# Patient Record
Sex: MALE | Race: WHITE | NOT HISPANIC OR LATINO | Employment: FULL TIME | URBAN - METROPOLITAN AREA
[De-identification: names, ages, dates, MRNs, and addresses within clinical notes are randomized per-mention and may not be internally consistent; named-entity substitution may affect disease eponyms.]

---

## 2022-04-12 ENCOUNTER — EVALUATION (OUTPATIENT)
Dept: PHYSICAL THERAPY | Facility: CLINIC | Age: 27
End: 2022-04-12
Payer: COMMERCIAL

## 2022-04-12 DIAGNOSIS — M62.81 MUSCLE WEAKNESS (GENERALIZED): ICD-10-CM

## 2022-04-12 DIAGNOSIS — M54.50 CHRONIC BILATERAL LOW BACK PAIN WITHOUT SCIATICA: Primary | ICD-10-CM

## 2022-04-12 DIAGNOSIS — G89.29 CHRONIC BILATERAL LOW BACK PAIN WITHOUT SCIATICA: Primary | ICD-10-CM

## 2022-04-12 PROCEDURE — 97110 THERAPEUTIC EXERCISES: CPT | Performed by: PHYSICAL THERAPIST

## 2022-04-12 PROCEDURE — 97162 PT EVAL MOD COMPLEX 30 MIN: CPT | Performed by: PHYSICAL THERAPIST

## 2022-04-12 NOTE — LETTER
2022    Adrian Hermosillo MD  [address] [address]    Patient: Ebenezer Nobles   YOB: 1995   Date of Visit: 2022     Encounter Diagnosis     ICD-10-CM    1  Chronic bilateral low back pain without sciatica  M54 50     G89 29    2  Muscle weakness (generalized)  M62 81        Dear Dr Amie Lares: Thank you for your recent referral of Mason Corcoran  Please review the attached evaluation summary from Mason's recent visit  Please verify that you agree with the plan of care by signing the attached order  If you have any questions or concerns, please do not hesitate to call  I sincerely appreciate the opportunity to share in the care of one of your patients and hope to have another opportunity to work with you in the near future  Sincerely,    Faith Sheppard, PT      Referring Provider:      I certify that I have read the below Plan of Care and certify the need for these services furnished under this plan of treatment while under my care  Adrian Hermosillo MD  [address] [address]  Via Fax: [phone]          PT Evaluation     Today's date: 2022  Patient name: Ebenezer Nobles  : 1995  MRN: [id number]  Referring provider: Harmony Feldman MD  Dx:   Encounter Diagnosis     ICD-10-CM    1  Chronic bilateral low back pain without sciatica  M54 50     G89 29    2   Muscle weakness (generalized)  M62 81          Assessment  Assessment details: Ebenezer Nobles is a 31 y o  male presenting to outpatient physical therapy at [place] [address] with complaints of chronic low back pain with recent exacerbation in March after lifting   He presents with decreased range of motion, decreased strength, limited flexibility, poor postural awareness, poor body mechanics, poor balance, decreased tolerance to activity and decreased functional mobility due to Chronic bilateral low back pain without sciatica  (primary encounter diagnosis), and Muscle weakness (generalized)  Therapist discussed diagnosis, prognosis, plan of care, proper responses to exercises, HEP, and modalities to use at home   [facility] would benefit from skilled PT services in order to address these deficits and reach maximum level of function   Thank you for the referral!  Impairments: abnormal coordination, abnormal muscle firing, abnormal muscle tone, abnormal or restricted ROM, abnormal movement, activity intolerance, impaired physical strength, lacks appropriate home exercise program, pain with function, scapular dyskinesis, poor posture  and poor body mechanics    Symptom irritability: moderateUnderstanding of Dx/Px/POC: good   Prognosis: good    Goals  STGs (in 4 weeks):  1  Pt will report having at least a 50% improvement since I E    2  Pt will report having at most a 2/10 pain level with functional mobility  3  Pt will have 4+/5 MMT strength throughout core and BLEs  LTGs (in 12 weeks):  1  Pt will report having at most a 75% improvement since I E    2  Pt will demonstrate good lifting techniques without onset of pain  3  Pt will be independent with HEP  4  Pt will deny having pain with getting OOB in AM      Plan  Patient would benefit from: skilled physical therapy  Planned modality interventions: TENS and unattended electrical stimulation  Planned therapy interventions: joint mobilization, manual therapy, neuromuscular re-education, patient education, self care, strengthening, stretching, therapeutic activities, therapeutic exercise, home exercise program, gait training, flexibility and balance  Frequency: 2x week  Plan of Care beginning date: 4/12/2022  Plan of Care expiration date: 7/5/2022  Treatment plan discussed with: patient        Subjective Evaluation    History of Present Illness  Mechanism of injury: Pt is a 31year old male who presents with chief complaint of chronic low back pain with exacerbation in March after his back gave out when he was lifting  He reports imaging revealed slight bulging disc  He was having radiculopathy however denies having it currently  Now referred to skilled OP PT  Quality of life: good    Pain  Current pain ratin  At best pain ratin  At worst pain ratin  Quality: dull ache, discomfort, pressure, sharp, tight, throbbing and pulling  Relieving factors: heat, medications, relaxation and rest  Aggravating factors: lifting and running (AM pain, difficulty OOB transfers)  Progression: worsening    Treatments  Previous treatment: chiropractic  Patient Goals  Patient goals for therapy: decreased pain, improved balance, increased motion, increased strength, independence with ADLs/IADLs, return to sport/leisure activities and return to work          Objective     Posture: Lumbar lordosis is decreased in standing  There is no lateral shift  In siting, lumbar roll increased comfort      Lumbar AROM limitations:  (*=  Pain)  Lumbar flexion: 100%  Lumbar extension: 75%  R side glide:  75%  L side glide:  75%    Mechanical Asessment: pre-test symtpoms include low back discomfort (L worse than R side)  Repeated Extension in Standing (OMA): improved  Repeated Extension in Lying (REIL):  improved    Strength:  Core strength: Upper abs: 3-/5; lower abs: 3-/5     Right  Left  Hip flexion:  4/5  4/5  Knee ext  4/5  4/5  Ankle DF  4/5  4/5  Ankle PF  4/5  4/5  Knee flex  4-/5  4-/5      Hip abduction  4/5  4-/5  Hip adduction  4-/5  3+/5  Hip extension  4-/5  3+/5    Joint mobility: decreased PA glides lumbar spine    Tenderness/Palpation: TTPR B lumbar paraspinals (L worse than R)    Sensation: intact throughout BLE to light touch     Flexibility: decreased HS, hip flexor, glutes, and quadriceps flexibility    Function: decreased glute activation with squats with increased trunk flexion        Precautions: hx of LBP c radiculopathy    HEP: PPU, prone quad stretch c SOS, TAC, Hooklying Ball Squeezes, TB Bridges; TB Sidelying Clamshells, TAC, Foam roller, piriformis stretch    Specialty Daily Treatment Diary       Manual                                                Exercise Diary                 PPU 5 sec x 10       PPU c OP        Prone quad stretch c SOS c glute set 20 sec x 3               Prone H' Ext        Swimmer's (alt UE and alt LE)        Superman                TAC 10 sec x 5       TAC c Marching        TAC c OH Reach c BLE lifted        TAC c SLR (flex)        TAC Dying Bug                Quadruped TAC c Alt UE and LE                TAC c Plank        Plank c BLE on Red PB                        Hooklying Ball Squeeze 5 sec x 5       Ball c Bridge                Sidelying Clamshells BTB 5 secx 10 ea       TB Bridges BTB 5 sec x 10                Sidelying H' ABD        Sidelying Combo TB H' ABD to Extension                        TB Squats                                                Foam Roller 5 mins piriformis and thoracic spine               Static stretchin way piriformis; hamstring stretch 20 sec x 3 (2 way piriformis stretch)                       HEP/EDU Diagnosis, prognosis, plan of care, proper responses to exercises, foam roller       Modalities                CP                   Skin checks performed pre and post application: intact

## 2022-04-12 NOTE — PROGRESS NOTES
PT Evaluation     Today's date: 2022  Patient name: Radha Peace  : 1995  MRN: [id number]  Referring provider: Sofie Wilder MD  Dx:   Encounter Diagnosis     ICD-10-CM    1  Chronic bilateral low back pain without sciatica  M54 50     G89 29    2  Muscle weakness (generalized)  M62 81          Assessment  Assessment details: Radha Peace is a 31 y o  male presenting to outpatient physical therapy at [place] [address] with complaints of chronic low back pain with recent exacerbation in March after lifting   He presents with decreased range of motion, decreased strength, limited flexibility, poor postural awareness, poor body mechanics, poor balance, decreased tolerance to activity and decreased functional mobility due to Chronic bilateral low back pain without sciatica  (primary encounter diagnosis), and Muscle weakness (generalized)  Therapist discussed diagnosis, prognosis, plan of care, proper responses to exercises, HEP, and modalities to use at home   [facility] would benefit from skilled PT services in order to address these deficits and reach maximum level of function   Thank you for the referral!  Impairments: abnormal coordination, abnormal muscle firing, abnormal muscle tone, abnormal or restricted ROM, abnormal movement, activity intolerance, impaired physical strength, lacks appropriate home exercise program, pain with function, scapular dyskinesis, poor posture  and poor body mechanics    Symptom irritability: moderateUnderstanding of Dx/Px/POC: good   Prognosis: good    Goals  STGs (in 4 weeks):  1  Pt will report having at least a 50% improvement since I E    2  Pt will report having at most a 2/10 pain level with functional mobility  3  Pt will have 4+/5 MMT strength throughout core and BLEs  LTGs (in 12 weeks):  1  Pt will report having at most a 75% improvement since I E    2  Pt will demonstrate good lifting techniques without onset of pain  3  Pt will be independent with HEP     4  Pt will deny having pain with getting OOB in AM      Plan  Patient would benefit from: skilled physical therapy  Planned modality interventions: TENS and unattended electrical stimulation  Planned therapy interventions: joint mobilization, manual therapy, neuromuscular re-education, patient education, self care, strengthening, stretching, therapeutic activities, therapeutic exercise, home exercise program, gait training, flexibility and balance  Frequency: 2x week  Plan of Care beginning date: 2022  Plan of Care expiration date: 2022  Treatment plan discussed with: patient        Subjective Evaluation    History of Present Illness  Mechanism of injury: Pt is a 31year old male who presents with chief complaint of chronic low back pain with exacerbation in March after his back gave out when he was lifting  He reports imaging revealed slight bulging disc  He was having radiculopathy however denies having it currently  Now referred to skilled OP PT  Quality of life: good    Pain  Current pain ratin  At best pain ratin  At worst pain ratin  Quality: dull ache, discomfort, pressure, sharp, tight, throbbing and pulling  Relieving factors: heat, medications, relaxation and rest  Aggravating factors: lifting and running (AM pain, difficulty OOB transfers)  Progression: worsening    Treatments  Previous treatment: chiropractic  Patient Goals  Patient goals for therapy: decreased pain, improved balance, increased motion, increased strength, independence with ADLs/IADLs, return to sport/leisure activities and return to work          Objective     Posture: Lumbar lordosis is decreased in standing  There is no lateral shift  In siting, lumbar roll increased comfort      Lumbar AROM limitations:  (*=  Pain)  Lumbar flexion: 100%  Lumbar extension: 75%  R side glide:  75%  L side glide:  75%    Mechanical Asessment: pre-test symtpoms include low back discomfort (L worse than R side)  Repeated Extension in Standing (OMA): improved  Repeated Extension in Lying (REIL):  improved    Strength:  Core strength: Upper abs: 3-/5; lower abs: 3-/5     Right  Left  Hip flexion:  4/5  4/5  Knee ext  4/5  4/5  Ankle DF  4/5  4/5  Ankle PF  4/5  4/5  Knee flex  4-/5  4-/5      Hip abduction  4/5  4-/5  Hip adduction  4-/5  3+/5  Hip extension  4-/5  3+/5    Joint mobility: decreased PA glides lumbar spine    Tenderness/Palpation: TTPR B lumbar paraspinals (L worse than R)    Sensation: intact throughout BLE to light touch     Flexibility: decreased HS, hip flexor, glutes, and quadriceps flexibility    Function: decreased glute activation with squats with increased trunk flexion        Precautions: hx of LBP c radiculopathy    HEP: PPU, prone quad stretch c SOS, TAC, Hooklying Ball Squeezes, TB Bridges; TB Sidelying Clamshells, TAC, Foam roller, piriformis stretch    Specialty Daily Treatment Diary       Manual                                                Exercise Diary                 PPU 5 sec x 10       PPU c OP        Prone quad stretch c SOS c glute set 20 sec x 3               Prone H' Ext        Swimmer's (alt UE and alt LE)        Superman                TAC 10 sec x 5       TAC c Marching        TAC c OH Reach c BLE lifted        TAC c SLR (flex)        TAC Dying Bug                Quadruped TAC c Alt UE and LE                TAC c Plank        Plank c BLE on Red PB                        Hooklying Ball Squeeze 5 sec x 5       Ball c Bridge                Sidelying Clamshells BTB 5 secx 10 ea       TB Bridges BTB 5 sec x 10                Sidelying H' ABD        Sidelying Combo TB H' ABD to Extension                        TB Squats                                                Foam Roller 5 mins piriformis and thoracic spine               Static stretchin way piriformis; hamstring stretch 20 sec x 3 (2 way piriformis stretch)                       HEP/EDU Diagnosis, prognosis, plan of care, proper responses to exercises, foam roller       Modalities                CP                   Skin checks performed pre and post application: intact

## 2022-04-19 ENCOUNTER — OFFICE VISIT (OUTPATIENT)
Dept: PHYSICAL THERAPY | Facility: CLINIC | Age: 27
End: 2022-04-19
Payer: COMMERCIAL

## 2022-04-19 DIAGNOSIS — M79.672 LEFT FOOT PAIN: Primary | ICD-10-CM

## 2022-04-19 PROCEDURE — 97162 PT EVAL MOD COMPLEX 30 MIN: CPT | Performed by: PHYSICAL THERAPIST

## 2022-04-19 PROCEDURE — 97110 THERAPEUTIC EXERCISES: CPT | Performed by: PHYSICAL THERAPIST

## 2022-04-19 PROCEDURE — 97140 MANUAL THERAPY 1/> REGIONS: CPT | Performed by: PHYSICAL THERAPIST

## 2022-04-19 NOTE — PROGRESS NOTES
PT Evaluation     Today's date: 2022  Patient name: Juan Aranda  : 1995  MRN: [id number]  Referring provider: Hanna Fuentes, PT  Dx:   Encounter Diagnosis     ICD-10-CM    1  Left foot pain  M79 672                   Assessment  Assessment details: Juan Aranda is a 31 y o  male presenting to outpatient physical therapy at [place] [address] with complaints of chronic progressive L foot pain with insidious onset    He presents with decreased range of motion, decreased strength, limited flexibility, poor postural awareness, poor body mechanics, poor balance, decreased tolerance to activity and decreased functional mobility due to Left foot pain  (primary encounter diagnosis)  Therapist discussed diagnosis, prognosis, plan of care, proper responses to exercises, HEP, modalities to use at home, and discussed night splint    He would benefit from skilled PT services in order to address these deficits and reach maximum level of function   Thank you for the referral!  Impairments: abnormal coordination, abnormal gait, abnormal muscle firing, abnormal muscle tone, abnormal or restricted ROM, abnormal movement, activity intolerance, impaired physical strength, lacks appropriate home exercise program, pain with function and poor body mechanics    Symptom irritability: moderateUnderstanding of Dx/Px/POC: good   Prognosis: good    Goals  STGs (in 4 weeks):  1  Pt will report having at least a 50% improvement since I E    2  Pt will report having at most a 2/10 pain level with functional mobility  3  Pt will demonstrate improved intrinsic strength of his left foot to perform toe crunches without onset of pain  LTGs (in 12 weeks):  1  Pt will report having at least a 75% improvement since I E    2  Pt will be able to stand at work with good posture without onset of pain  3  Pt will report having minimal AM pain when he gets OOB  4  Pt will be independent with HEP       Plan  Patient would benefit from: skilled physical therapy  Planned modality interventions: unattended electrical stimulation, ultrasound and TENS  Planned therapy interventions: joint mobilization, manual therapy, neuromuscular re-education, patient education, self care, strengthening, stretching, therapeutic activities, therapeutic exercise, home exercise program, gait training, flexibility and balance  Frequency: 2x week  Plan of Care beginning date: 2022  Plan of Care expiration date: 2022  Treatment plan discussed with: patient        Subjective Evaluation    History of Present Illness  Mechanism of injury: Pt is a 31year old male with insidious onset of left foot pain that was increasing over the past 6 months  Quality of life: good    Pain  Current pain ratin  At best pain ratin  At worst pain ratin  Quality: discomfort, dull ache, sharp, tight and pressure  Relieving factors: relaxation and rest  Exacerbated by: prolonged standing, AM pain/stiffness  Progression: worsening    Patient Goals  Patient goals for therapy: improved balance, decreased pain, increased motion, increased strength, independence with ADLs/IADLs, return to sport/leisure activities and return to work          Objective    Palpation: TTPR origin plantar fascia    AROM:    R  L  Ankle DF     20  15  Ankle PF    55  50  Ankle inversion  35  30  Ankle eversion   20  15    STRENGTH:    R  L    Ankle DF   4/5  4/5  Ankle PF   4/5  3+/5  Ankle inversion  4/5  3+/5  Ankle eversoin   4/5  3+/5    Static Knee Ext  4+/5  4+/5  Static Knee flex  4+/5  4+/5  Hip abduction   4/5  4/5  Hip extension   4/5  4/5      Pt's ambulation demonstrates symmetrical gait pattern       Function:  Squat: slight weight shift on the RLE with valgus collapse of medial longitudinal arch  Step up/down: reciprocal       Precautions: standard    HEP: toe scrunches, dowel roll, plantar stretch, gastroc-soleus stretch    Specialty Daily Treatment Diary       Manual        STM/TPR L plantar aspect SMF       IASTM L calf and plantar aspect  SMF                               Exercise Diary                 TB Inversion/ Eversion                Seated Toe Crunches c transfer 1 min        Dowel Roll 1  min       Toe Yoga (GT down, little toes up; GT up and little toes down)        Kevil                 Standing HR eccentric lowering        Toe raises with back against wall                Slant board gastroc-soleus stretch 20 sec x 3 ea               Standing Toe extension stretch 10 sec x 3 ea                               Supine HS stretch 20 sec x 3 ea                       Foam Roller calves                HEP/EDU        Modalities                CP                   Skin checks performed pre and post application: intact

## 2022-04-26 ENCOUNTER — OFFICE VISIT (OUTPATIENT)
Dept: PHYSICAL THERAPY | Facility: CLINIC | Age: 27
End: 2022-04-26
Payer: COMMERCIAL

## 2022-04-26 DIAGNOSIS — M54.50 CHRONIC BILATERAL LOW BACK PAIN WITHOUT SCIATICA: ICD-10-CM

## 2022-04-26 DIAGNOSIS — M62.81 MUSCLE WEAKNESS (GENERALIZED): ICD-10-CM

## 2022-04-26 DIAGNOSIS — M79.672 LEFT FOOT PAIN: Primary | ICD-10-CM

## 2022-04-26 DIAGNOSIS — G89.29 CHRONIC BILATERAL LOW BACK PAIN WITHOUT SCIATICA: ICD-10-CM

## 2022-04-26 PROCEDURE — 97112 NEUROMUSCULAR REEDUCATION: CPT

## 2022-04-26 PROCEDURE — 97110 THERAPEUTIC EXERCISES: CPT

## 2022-04-26 NOTE — PROGRESS NOTES
Daily Note     Today's date: 2022  Patient name: Anita Blankenship  : 1995  MRN: [id number]  Referring provider: Kishan Eason, PT  Dx:   Encounter Diagnosis     ICD-10-CM    1  Left foot pain  M79 672    2  Chronic bilateral low back pain without sciatica  M54 50     G89 29    3  Muscle weakness (generalized)  M62 81                   Subjective: Patient states he is feeling some soreness today  Objective: See treatment diary below      Assessment: Tolerated treatment well  Initiated POC as able today  Continued to progress through exercises  No increase in pain was noted throughout session  Encouraged patient to continue stretching at home  Patient demonstrated fatigue post treatment, exhibited good technique with therapeutic exercises and would benefit from continued PT      Plan: Continue per plan of care        Precautions: hx of LBP c radiculopathy    HEP: PPU, prone quad stretch c SOS, TAC, Hooklying Ball Squeezes, TB Bridges; TB Sidelying Clamshells, TAC, Foam roller, piriformis stretch    Specialty Daily Treatment Diary       Manual                                               Exercise Diary                 PPU 5 sec x 10 5 sec x 15      PPU c OP        Prone quad stretch c SOS c glute set 20 sec x 3 20 sec x 3               Prone H' Ext        Swimmer's (alt UE and alt LE)        Superman                TAC 10 sec x 5 5 sec x 20      TAC c Marching        TAC c OH Reach c BLE lifted  3# 2x10      TAC c SLR (flex)        TAC Dying Bug                Quadruped TAC c Alt UE and LE                TAC c Plank        Plank c BLE on Red PB                        Hooklying Ball Squeeze 5 sec x 5 5 sec x 20      Ball c Bridge                Sidelying Clamshells BTB 5 secx 10 ea BTB 5 sec x 20      TB Bridges BTB 5 sec x 10  BTB 5 sec x 20              Sidelying H' ABD        Sidelying Combo TB H' ABD to Extension                        TB Squats Foam Roller 5 mins piriformis and thoracic spine 5 min piriformis and thoracic spine              Static stretchin way piriformis; hamstring stretch 20 sec x 3 (2 way piriformis stretch) 20 sec x 3                       HEP/EDU Diagnosis, prognosis, plan of care, proper responses to exercises, foam roller       Modalities                CP

## 2022-05-03 ENCOUNTER — OFFICE VISIT (OUTPATIENT)
Dept: PHYSICAL THERAPY | Facility: CLINIC | Age: 27
End: 2022-05-03
Payer: COMMERCIAL

## 2022-05-03 DIAGNOSIS — G89.29 CHRONIC BILATERAL LOW BACK PAIN WITHOUT SCIATICA: Primary | ICD-10-CM

## 2022-05-03 DIAGNOSIS — M54.50 CHRONIC BILATERAL LOW BACK PAIN WITHOUT SCIATICA: Primary | ICD-10-CM

## 2022-05-03 DIAGNOSIS — M62.81 MUSCLE WEAKNESS (GENERALIZED): ICD-10-CM

## 2022-05-03 PROCEDURE — 97110 THERAPEUTIC EXERCISES: CPT

## 2022-05-03 PROCEDURE — 97112 NEUROMUSCULAR REEDUCATION: CPT

## 2022-05-03 NOTE — PROGRESS NOTES
Daily Note     Today's date: 5/3/2022  Patient name: Dru Dash  : 1995  MRN: [id number]  Referring provider: Sadie Guadarrama, PT  Dx:   Encounter Diagnosis     ICD-10-CM    1  Chronic bilateral low back pain without sciatica  M54 50     G89 29    2  Muscle weakness (generalized)  M62 81                   Subjective: Patient offers no new complaints arriving to therapy  Objective: See treatment diary below      Assessment: Tolerated treatment well  Continued to progress patient through all TE today  Added in additional exercises with good response, denies increase in sxs throughout session  Patient demonstrated fatigue post treatment, exhibited good technique with therapeutic exercises and would benefit from continued PT      Plan: Continue per plan of care        Precautions: hx of LBP c radiculopathy    HEP: PPU, prone quad stretch c SOS, TAC, Hooklying Ball Squeezes, TB Bridges; TB Sidelying Clamshells, TAC, Foam roller, piriformis stretch    Specialty Daily Treatment Diary       Manual 4/12 4/26 5/3                                             Exercise Diary                 PPU 5 sec x 10 5 sec x 15 5 sec x 20     PPU c OP        Prone quad stretch c SOS c glute set 20 sec x 3 20 sec x 3  20 sec x 3              Prone H' Ext   2x10 b/l      Swimmer's (alt UE and alt LE)        Superman                TAC 10 sec x 5 5 sec x 20 5 sec x 30     TAC c Marching        TAC c OH Reach c BLE lifted  3# 2x10 3# 3x10     TAC c SLR (flex)   2x10 b/l      TAC Dying Bug   10x b/l              Quadruped TAC c Alt UE and LE   10x b/             TAC c Plank        Plank c BLE on Red PB                        Hooklying Ball Squeeze 5 sec x 5 5 sec x 20 5 sec x 30     Ball c Bridge                Sidelying Clamshells BTB 5 secx 10 ea BTB 5 sec x 20 BTB 5 sec x 30     TB Bridges BTB 5 sec x 10  BTB 5 sec x 20 BTB 5 sec x 30             Sidelying H' ABD        Sidelying Combo TB H' ABD to Extension TB Squats                                                Foam Roller 5 mins piriformis and thoracic spine 5 min piriformis and thoracic spine 5 min pirformis and throacic  Spine              Static stretchin way piriformis; hamstring stretch 20 sec x 3 (2 way piriformis stretch) 20 sec x 3  20 sec x 3                      HEP/EDU Diagnosis, prognosis, plan of care, proper responses to exercises, foam roller       Modalities                CP

## 2022-05-10 ENCOUNTER — APPOINTMENT (OUTPATIENT)
Dept: PHYSICAL THERAPY | Facility: CLINIC | Age: 27
End: 2022-05-10
Payer: COMMERCIAL

## 2022-05-17 ENCOUNTER — APPOINTMENT (OUTPATIENT)
Dept: PHYSICAL THERAPY | Facility: CLINIC | Age: 27
End: 2022-05-17
Payer: COMMERCIAL

## 2022-05-23 ENCOUNTER — APPOINTMENT (OUTPATIENT)
Dept: PHYSICAL THERAPY | Facility: CLINIC | Age: 27
End: 2022-05-23
Payer: COMMERCIAL

## 2022-05-24 ENCOUNTER — APPOINTMENT (OUTPATIENT)
Dept: PHYSICAL THERAPY | Facility: CLINIC | Age: 27
End: 2022-05-24
Payer: COMMERCIAL

## 2022-05-31 ENCOUNTER — APPOINTMENT (OUTPATIENT)
Dept: PHYSICAL THERAPY | Facility: CLINIC | Age: 27
End: 2022-05-31
Payer: COMMERCIAL